# Patient Record
Sex: FEMALE | Race: WHITE | NOT HISPANIC OR LATINO | URBAN - METROPOLITAN AREA
[De-identification: names, ages, dates, MRNs, and addresses within clinical notes are randomized per-mention and may not be internally consistent; named-entity substitution may affect disease eponyms.]

---

## 2019-12-17 ENCOUNTER — APPOINTMENT (RX ONLY)
Dept: URBAN - METROPOLITAN AREA CLINIC 153 | Facility: CLINIC | Age: 74
Setting detail: DERMATOLOGY
End: 2019-12-17

## 2019-12-17 DIAGNOSIS — L57.0 ACTINIC KERATOSIS: ICD-10-CM

## 2019-12-17 DIAGNOSIS — D22 MELANOCYTIC NEVI: ICD-10-CM

## 2019-12-17 DIAGNOSIS — L82.1 OTHER SEBORRHEIC KERATOSIS: ICD-10-CM

## 2019-12-17 DIAGNOSIS — D18.0 HEMANGIOMA: ICD-10-CM

## 2019-12-17 DIAGNOSIS — L81.4 OTHER MELANIN HYPERPIGMENTATION: ICD-10-CM

## 2019-12-17 PROBLEM — D18.01 HEMANGIOMA OF SKIN AND SUBCUTANEOUS TISSUE: Status: ACTIVE | Noted: 2019-12-17

## 2019-12-17 PROBLEM — D22.5 MELANOCYTIC NEVI OF TRUNK: Status: ACTIVE | Noted: 2019-12-17

## 2019-12-17 PROCEDURE — 17000 DESTRUCT PREMALG LESION: CPT

## 2019-12-17 PROCEDURE — ? LIQUID NITROGEN

## 2019-12-17 PROCEDURE — ? COUNSELING

## 2019-12-17 PROCEDURE — 99203 OFFICE O/P NEW LOW 30 MIN: CPT | Mod: 25

## 2019-12-17 ASSESSMENT — LOCATION DETAILED DESCRIPTION DERM
LOCATION DETAILED: PERIUMBILICAL SKIN
LOCATION DETAILED: EPIGASTRIC SKIN
LOCATION DETAILED: SUBXIPHOID
LOCATION DETAILED: LEFT CENTRAL BUCCAL CHEEK

## 2019-12-17 ASSESSMENT — LOCATION SIMPLE DESCRIPTION DERM
LOCATION SIMPLE: ABDOMEN
LOCATION SIMPLE: LEFT CHEEK

## 2019-12-17 ASSESSMENT — LOCATION ZONE DERM
LOCATION ZONE: FACE
LOCATION ZONE: TRUNK

## 2020-12-15 ENCOUNTER — APPOINTMENT (RX ONLY)
Dept: URBAN - METROPOLITAN AREA CLINIC 153 | Facility: CLINIC | Age: 75
Setting detail: DERMATOLOGY
End: 2020-12-15

## 2020-12-15 DIAGNOSIS — L82.1 OTHER SEBORRHEIC KERATOSIS: ICD-10-CM

## 2020-12-15 DIAGNOSIS — D18.0 HEMANGIOMA: ICD-10-CM

## 2020-12-15 DIAGNOSIS — D22 MELANOCYTIC NEVI: ICD-10-CM

## 2020-12-15 DIAGNOSIS — L81.4 OTHER MELANIN HYPERPIGMENTATION: ICD-10-CM

## 2020-12-15 PROBLEM — D18.01 HEMANGIOMA OF SKIN AND SUBCUTANEOUS TISSUE: Status: ACTIVE | Noted: 2020-12-15

## 2020-12-15 PROBLEM — D22.5 MELANOCYTIC NEVI OF TRUNK: Status: ACTIVE | Noted: 2020-12-15

## 2020-12-15 PROCEDURE — 99214 OFFICE O/P EST MOD 30 MIN: CPT

## 2020-12-15 PROCEDURE — ? COUNSELING

## 2020-12-15 ASSESSMENT — LOCATION SIMPLE DESCRIPTION DERM
LOCATION SIMPLE: UPPER BACK
LOCATION SIMPLE: LOWER BACK

## 2020-12-15 ASSESSMENT — LOCATION DETAILED DESCRIPTION DERM
LOCATION DETAILED: INFERIOR THORACIC SPINE
LOCATION DETAILED: SUPERIOR LUMBAR SPINE

## 2020-12-15 ASSESSMENT — LOCATION ZONE DERM: LOCATION ZONE: TRUNK

## 2021-12-14 ENCOUNTER — APPOINTMENT (RX ONLY)
Dept: URBAN - METROPOLITAN AREA CLINIC 153 | Facility: CLINIC | Age: 76
Setting detail: DERMATOLOGY
End: 2021-12-14

## 2021-12-14 DIAGNOSIS — D22 MELANOCYTIC NEVI: ICD-10-CM

## 2021-12-14 DIAGNOSIS — D18.0 HEMANGIOMA: ICD-10-CM

## 2021-12-14 DIAGNOSIS — L81.4 OTHER MELANIN HYPERPIGMENTATION: ICD-10-CM

## 2021-12-14 DIAGNOSIS — L82.1 OTHER SEBORRHEIC KERATOSIS: ICD-10-CM

## 2021-12-14 DIAGNOSIS — Z71.89 OTHER SPECIFIED COUNSELING: ICD-10-CM

## 2021-12-14 DIAGNOSIS — L85.3 XEROSIS CUTIS: ICD-10-CM | Status: INADEQUATELY CONTROLLED

## 2021-12-14 DIAGNOSIS — L57.0 ACTINIC KERATOSIS: ICD-10-CM

## 2021-12-14 PROBLEM — D18.01 HEMANGIOMA OF SKIN AND SUBCUTANEOUS TISSUE: Status: ACTIVE | Noted: 2021-12-14

## 2021-12-14 PROBLEM — D22.5 MELANOCYTIC NEVI OF TRUNK: Status: ACTIVE | Noted: 2021-12-14

## 2021-12-14 PROCEDURE — ? COUNSELING

## 2021-12-14 PROCEDURE — 17003 DESTRUCT PREMALG LES 2-14: CPT

## 2021-12-14 PROCEDURE — ? ADDITIONAL NOTES

## 2021-12-14 PROCEDURE — 17000 DESTRUCT PREMALG LESION: CPT

## 2021-12-14 PROCEDURE — 99213 OFFICE O/P EST LOW 20 MIN: CPT | Mod: 25

## 2021-12-14 PROCEDURE — ? TREATMENT REGIMEN

## 2021-12-14 PROCEDURE — ? LIQUID NITROGEN

## 2021-12-14 ASSESSMENT — LOCATION DETAILED DESCRIPTION DERM
LOCATION DETAILED: LEFT SUPERIOR MEDIAL MALAR CHEEK
LOCATION DETAILED: INFERIOR THORACIC SPINE
LOCATION DETAILED: LEFT INFERIOR CENTRAL MALAR CHEEK
LOCATION DETAILED: SUPERIOR LUMBAR SPINE
LOCATION DETAILED: NASAL TIP
LOCATION DETAILED: LEFT INFERIOR UPPER BACK
LOCATION DETAILED: EPIGASTRIC SKIN
LOCATION DETAILED: RIGHT INFERIOR UPPER BACK

## 2021-12-14 ASSESSMENT — LOCATION SIMPLE DESCRIPTION DERM
LOCATION SIMPLE: UPPER BACK
LOCATION SIMPLE: NOSE
LOCATION SIMPLE: LOWER BACK
LOCATION SIMPLE: LEFT UPPER BACK
LOCATION SIMPLE: RIGHT UPPER BACK
LOCATION SIMPLE: ABDOMEN
LOCATION SIMPLE: LEFT CHEEK

## 2021-12-14 ASSESSMENT — LOCATION ZONE DERM
LOCATION ZONE: NOSE
LOCATION ZONE: FACE
LOCATION ZONE: TRUNK

## 2022-12-19 ENCOUNTER — APPOINTMENT (RX ONLY)
Dept: URBAN - METROPOLITAN AREA CLINIC 153 | Facility: CLINIC | Age: 77
Setting detail: DERMATOLOGY
End: 2022-12-19

## 2022-12-19 DIAGNOSIS — D18.0 HEMANGIOMA: ICD-10-CM

## 2022-12-19 DIAGNOSIS — L57.0 ACTINIC KERATOSIS: ICD-10-CM

## 2022-12-19 DIAGNOSIS — D22 MELANOCYTIC NEVI: ICD-10-CM

## 2022-12-19 DIAGNOSIS — L82.1 OTHER SEBORRHEIC KERATOSIS: ICD-10-CM

## 2022-12-19 DIAGNOSIS — L81.4 OTHER MELANIN HYPERPIGMENTATION: ICD-10-CM

## 2022-12-19 DIAGNOSIS — Z71.89 OTHER SPECIFIED COUNSELING: ICD-10-CM

## 2022-12-19 DIAGNOSIS — L85.3 XEROSIS CUTIS: ICD-10-CM | Status: INADEQUATELY CONTROLLED

## 2022-12-19 PROBLEM — D18.01 HEMANGIOMA OF SKIN AND SUBCUTANEOUS TISSUE: Status: ACTIVE | Noted: 2022-12-19

## 2022-12-19 PROBLEM — D48.5 NEOPLASM OF UNCERTAIN BEHAVIOR OF SKIN: Status: ACTIVE | Noted: 2022-12-19

## 2022-12-19 PROBLEM — D22.5 MELANOCYTIC NEVI OF TRUNK: Status: ACTIVE | Noted: 2022-12-19

## 2022-12-19 PROCEDURE — 17000 DESTRUCT PREMALG LESION: CPT | Mod: 59

## 2022-12-19 PROCEDURE — ? LIQUID NITROGEN

## 2022-12-19 PROCEDURE — ? COUNSELING

## 2022-12-19 PROCEDURE — 17003 DESTRUCT PREMALG LES 2-14: CPT

## 2022-12-19 PROCEDURE — 11102 TANGNTL BX SKIN SINGLE LES: CPT

## 2022-12-19 PROCEDURE — 99213 OFFICE O/P EST LOW 20 MIN: CPT | Mod: 25

## 2022-12-19 PROCEDURE — ? BIOPSY BY SHAVE METHOD

## 2022-12-19 PROCEDURE — ? TREATMENT REGIMEN

## 2022-12-19 ASSESSMENT — LOCATION SIMPLE DESCRIPTION DERM
LOCATION SIMPLE: LOWER BACK
LOCATION SIMPLE: LEFT UPPER BACK
LOCATION SIMPLE: NOSE
LOCATION SIMPLE: LEFT CHEEK
LOCATION SIMPLE: ABDOMEN
LOCATION SIMPLE: RIGHT UPPER BACK
LOCATION SIMPLE: UPPER BACK
LOCATION SIMPLE: LEFT ZYGOMA

## 2022-12-19 ASSESSMENT — LOCATION DETAILED DESCRIPTION DERM
LOCATION DETAILED: SUPERIOR LUMBAR SPINE
LOCATION DETAILED: LEFT SUPERIOR LATERAL MALAR CHEEK
LOCATION DETAILED: RIGHT NASAL DORSUM
LOCATION DETAILED: LEFT CENTRAL ZYGOMA
LOCATION DETAILED: RIGHT INFERIOR UPPER BACK
LOCATION DETAILED: EPIGASTRIC SKIN
LOCATION DETAILED: LEFT INFERIOR UPPER BACK
LOCATION DETAILED: LEFT MEDIAL MALAR CHEEK
LOCATION DETAILED: INFERIOR THORACIC SPINE

## 2022-12-19 ASSESSMENT — LOCATION ZONE DERM
LOCATION ZONE: TRUNK
LOCATION ZONE: NOSE
LOCATION ZONE: FACE

## 2022-12-19 NOTE — PROCEDURE: LIQUID NITROGEN
Render Post-Care Instructions In Note?: no
Duration Of Freeze Thaw-Cycle (Seconds): 3
Consent: The patient's consent was obtained including but not limited to risks of crusting, scabbing, blistering, scarring, darker or lighter pigmentary change, recurrence, incomplete removal and infection.
Detail Level: Detailed
Show Applicator Variable?: Yes
Application Tool (Optional): Liquid Nitrogen Sprayer
Post-Care Instructions: I reviewed with the patient in detail post-care instructions. Patient is to wear sunprotection, and avoid picking at any of the treated lesions. Pt may apply Vaseline to crusted or scabbing areas.
Number Of Freeze-Thaw Cycles: 3 freeze-thaw cycles

## 2022-12-19 NOTE — PROCEDURE: BIOPSY BY SHAVE METHOD
Detail Level: Detailed
Depth Of Biopsy: dermis
Was A Bandage Applied: Yes
Size Of Lesion In Cm: 0.3
Biopsy Type: H and E
Biopsy Method: Personna blade
Anesthesia Type: 1% lidocaine with epinephrine
Anesthesia Volume In Cc (Will Not Render If 0): 0.5
Additional Anesthesia Volume In Cc (Will Not Render If 0): 0
Hemostasis: Drysol
Wound Care: Petrolatum
Dressing: bandage
Destruction After The Procedure: No
Type Of Destruction Used: Curettage
Curettage Text: The wound bed was treated with curettage after the biopsy was performed.
Cryotherapy Text: The wound bed was treated with cryotherapy after the biopsy was performed.
Electrodesiccation Text: The wound bed was treated with electrodesiccation after the biopsy was performed.
Electrodesiccation And Curettage Text: The wound bed was treated with electrodesiccation and curettage after the biopsy was performed.
Silver Nitrate Text: The wound bed was treated with silver nitrate after the biopsy was performed.
Consent: Written consent was obtained and risks were reviewed including but not limited to scarring, infection, bleeding, scabbing, incomplete removal, nerve damage and allergy to anesthesia.
Post-Care Instructions: I reviewed with the patient in detail post-care instructions. Patient is to keep the biopsy site dry overnight, and then apply bacitracin twice daily until healed. Patient may apply hydrogen peroxide soaks to remove any crusting.
Notification Instructions: Patient will be notified of biopsy results. However, patient instructed to call the office if not contacted within 2 weeks.
Billing Type: Third-Party Bill
Information: Selecting Yes will display possible errors in your note based on the variables you have selected. This validation is only offered as a suggestion for you. PLEASE NOTE THAT THE VALIDATION TEXT WILL BE REMOVED WHEN YOU FINALIZE YOUR NOTE. IF YOU WANT TO FAX A PRELIMINARY NOTE YOU WILL NEED TO TOGGLE THIS TO 'NO' IF YOU DO NOT WANT IT IN YOUR FAXED NOTE.
independent/needs device

## 2023-06-20 ENCOUNTER — APPOINTMENT (RX ONLY)
Dept: URBAN - METROPOLITAN AREA CLINIC 153 | Facility: CLINIC | Age: 78
Setting detail: DERMATOLOGY
End: 2023-06-20

## 2023-06-20 DIAGNOSIS — Z41.9 ENCOUNTER FOR PROCEDURE FOR PURPOSES OTHER THAN REMEDYING HEALTH STATE, UNSPECIFIED: ICD-10-CM

## 2023-06-20 PROCEDURE — ? HYDRAFACIAL

## 2023-06-20 ASSESSMENT — LOCATION ZONE DERM: LOCATION ZONE: FACE

## 2023-06-20 ASSESSMENT — LOCATION SIMPLE DESCRIPTION DERM: LOCATION SIMPLE: LEFT CHEEK

## 2023-06-20 ASSESSMENT — LOCATION DETAILED DESCRIPTION DERM: LOCATION DETAILED: LEFT CENTRAL MALAR CHEEK

## 2023-06-20 NOTE — PROCEDURE: HYDRAFACIAL
Procedure: Extraction
Solution: GlySal 7.5%
Number Of Passes: 0
Consent: Written consent obtained, risks reviewed including but not limited to crusting, scabbing, blistering, scarring, darker or lighter pigmentary change, bruising, and/or incomplete response.
Tip Override
Procedure: Fusion
Post-Care Instructions: I reviewed with the patient in detail post-care instructions. Patient should stay away from the sun and wear sun protection until treated areas are fully healed.
Treatment Number: 1
Tip: Hydropeel Tip, Clear
Price (Use Numbers Only, No Special Characters Or $): 199
Solution: Beta-HD
Tip: Hydropeel Tip, Blue
Procedure: Extend and Protect
Procedure: Boost
Solution Override
Indication: anti-aging
Procedure: Exfoliation
Tip: Hydropeel Tip, Teal
Location: other
Procedure: Peel
Solution: Activ-4

## 2024-01-08 ENCOUNTER — APPOINTMENT (RX ONLY)
Dept: URBAN - METROPOLITAN AREA CLINIC 153 | Facility: CLINIC | Age: 79
Setting detail: DERMATOLOGY
End: 2024-01-08

## 2024-01-08 DIAGNOSIS — L85.3 XEROSIS CUTIS: ICD-10-CM | Status: INADEQUATELY CONTROLLED

## 2024-01-08 DIAGNOSIS — Z71.89 OTHER SPECIFIED COUNSELING: ICD-10-CM

## 2024-01-08 DIAGNOSIS — D22 MELANOCYTIC NEVI: ICD-10-CM

## 2024-01-08 DIAGNOSIS — L81.4 OTHER MELANIN HYPERPIGMENTATION: ICD-10-CM

## 2024-01-08 DIAGNOSIS — D18.0 HEMANGIOMA: ICD-10-CM

## 2024-01-08 DIAGNOSIS — L82.1 OTHER SEBORRHEIC KERATOSIS: ICD-10-CM

## 2024-01-08 DIAGNOSIS — L57.0 ACTINIC KERATOSIS: ICD-10-CM

## 2024-01-08 PROBLEM — D18.01 HEMANGIOMA OF SKIN AND SUBCUTANEOUS TISSUE: Status: ACTIVE | Noted: 2024-01-08

## 2024-01-08 PROBLEM — D22.5 MELANOCYTIC NEVI OF TRUNK: Status: ACTIVE | Noted: 2024-01-08

## 2024-01-08 PROCEDURE — 17003 DESTRUCT PREMALG LES 2-14: CPT

## 2024-01-08 PROCEDURE — ? TREATMENT REGIMEN

## 2024-01-08 PROCEDURE — ? COUNSELING

## 2024-01-08 PROCEDURE — 17000 DESTRUCT PREMALG LESION: CPT

## 2024-01-08 PROCEDURE — ? LIQUID NITROGEN

## 2024-01-08 PROCEDURE — 99213 OFFICE O/P EST LOW 20 MIN: CPT | Mod: 25

## 2024-01-08 ASSESSMENT — LOCATION DETAILED DESCRIPTION DERM
LOCATION DETAILED: INFERIOR THORACIC SPINE
LOCATION DETAILED: SUPERIOR LUMBAR SPINE
LOCATION DETAILED: LEFT LATERAL MALAR CHEEK
LOCATION DETAILED: NASAL ROOT
LOCATION DETAILED: EPIGASTRIC SKIN
LOCATION DETAILED: LEFT INFERIOR LATERAL MALAR CHEEK
LOCATION DETAILED: RIGHT INFERIOR UPPER BACK
LOCATION DETAILED: NASAL DORSUM
LOCATION DETAILED: LEFT INFERIOR UPPER BACK

## 2024-01-08 ASSESSMENT — LOCATION SIMPLE DESCRIPTION DERM
LOCATION SIMPLE: LEFT CHEEK
LOCATION SIMPLE: ABDOMEN
LOCATION SIMPLE: RIGHT UPPER BACK
LOCATION SIMPLE: LOWER BACK
LOCATION SIMPLE: LEFT UPPER BACK
LOCATION SIMPLE: NOSE
LOCATION SIMPLE: UPPER BACK

## 2024-01-08 ASSESSMENT — LOCATION ZONE DERM
LOCATION ZONE: TRUNK
LOCATION ZONE: NOSE
LOCATION ZONE: FACE

## 2025-01-06 ENCOUNTER — APPOINTMENT (OUTPATIENT)
Dept: URBAN - METROPOLITAN AREA CLINIC 153 | Facility: CLINIC | Age: 80
Setting detail: DERMATOLOGY
End: 2025-01-06

## 2025-01-06 DIAGNOSIS — L85.3 XEROSIS CUTIS: ICD-10-CM | Status: STABLE

## 2025-01-06 DIAGNOSIS — L30.9 DERMATITIS, UNSPECIFIED: ICD-10-CM | Status: INADEQUATELY CONTROLLED

## 2025-01-06 DIAGNOSIS — D18.0 HEMANGIOMA: ICD-10-CM

## 2025-01-06 DIAGNOSIS — L81.4 OTHER MELANIN HYPERPIGMENTATION: ICD-10-CM

## 2025-01-06 DIAGNOSIS — D22 MELANOCYTIC NEVI: ICD-10-CM

## 2025-01-06 DIAGNOSIS — Z71.89 OTHER SPECIFIED COUNSELING: ICD-10-CM

## 2025-01-06 DIAGNOSIS — L82.1 OTHER SEBORRHEIC KERATOSIS: ICD-10-CM

## 2025-01-06 PROBLEM — D22.5 MELANOCYTIC NEVI OF TRUNK: Status: ACTIVE | Noted: 2025-01-06

## 2025-01-06 PROBLEM — D18.01 HEMANGIOMA OF SKIN AND SUBCUTANEOUS TISSUE: Status: ACTIVE | Noted: 2025-01-06

## 2025-01-06 PROCEDURE — ? PRESCRIPTION

## 2025-01-06 PROCEDURE — 99214 OFFICE O/P EST MOD 30 MIN: CPT

## 2025-01-06 PROCEDURE — ? PRESCRIPTION MEDICATION MANAGEMENT

## 2025-01-06 PROCEDURE — ? TREATMENT REGIMEN

## 2025-01-06 PROCEDURE — ? ADDITIONAL NOTES

## 2025-01-06 PROCEDURE — ? COUNSELING

## 2025-01-06 RX ORDER — HYDROCORTISONE 25 MG/ML
LOTION TOPICAL BID
Qty: 118 | Refills: 1 | Status: ERX | COMMUNITY
Start: 2025-01-06

## 2025-01-06 RX ADMIN — HYDROCORTISONE: 25 LOTION TOPICAL at 00:00

## 2025-01-06 ASSESSMENT — ITCH NUMERIC RATING SCALE: HOW SEVERE IS YOUR ITCHING?: 2

## 2025-01-06 ASSESSMENT — LOCATION ZONE DERM
LOCATION ZONE: TRUNK
LOCATION ZONE: FACE

## 2025-01-06 ASSESSMENT — LOCATION DETAILED DESCRIPTION DERM
LOCATION DETAILED: LEFT MEDIAL MALAR CHEEK
LOCATION DETAILED: LEFT LATERAL ABDOMEN
LOCATION DETAILED: EPIGASTRIC SKIN
LOCATION DETAILED: LEFT INFERIOR UPPER BACK
LOCATION DETAILED: RIGHT CENTRAL MALAR CHEEK
LOCATION DETAILED: LEFT INFERIOR MEDIAL MIDBACK

## 2025-01-06 ASSESSMENT — LOCATION SIMPLE DESCRIPTION DERM
LOCATION SIMPLE: RIGHT CHEEK
LOCATION SIMPLE: LEFT CHEEK
LOCATION SIMPLE: ABDOMEN
LOCATION SIMPLE: LEFT LOWER BACK
LOCATION SIMPLE: LEFT UPPER BACK

## 2025-01-06 ASSESSMENT — BSA RASH: BSA RASH: 8

## 2025-01-06 NOTE — PROCEDURE: ADDITIONAL NOTES
Render Risk Assessment In Note?: no
Detail Level: Detailed
Additional Notes: Pt discussed change in makeup brand to [employer] at the start of breakout roughly a month ago, changed back to familiar [employer] 3wks ago

## 2025-01-06 NOTE — PROCEDURE: COUNSELING
Detail Level: Simple
Detail Level: Generalized
Sunscreen Recommendations: Patient is to use sunscreen daily on the affected areas

## 2025-01-06 NOTE — PROCEDURE: PRESCRIPTION MEDICATION MANAGEMENT
Initiate Treatment: Pt is to apply HC 2.5% twice daily for next 2wks until 2wk follow-up
Detail Level: Zone
Render In Strict Bullet Format?: No

## 2025-01-21 ENCOUNTER — APPOINTMENT (OUTPATIENT)
Dept: URBAN - METROPOLITAN AREA CLINIC 153 | Facility: CLINIC | Age: 80
Setting detail: DERMATOLOGY
End: 2025-01-21

## 2025-01-21 DIAGNOSIS — L30.9 DERMATITIS, UNSPECIFIED: ICD-10-CM

## 2025-01-21 PROCEDURE — 99214 OFFICE O/P EST MOD 30 MIN: CPT

## 2025-01-21 PROCEDURE — ? COUNSELING

## 2025-01-21 PROCEDURE — ? PRESCRIPTION MEDICATION MANAGEMENT

## 2025-01-21 PROCEDURE — ? ADDITIONAL NOTES

## 2025-01-21 ASSESSMENT — LOCATION SIMPLE DESCRIPTION DERM
LOCATION SIMPLE: RIGHT CHEEK
LOCATION SIMPLE: LEFT CHEEK

## 2025-01-21 ASSESSMENT — SEVERITY ASSESSMENT: SEVERITY: MILD

## 2025-01-21 ASSESSMENT — BSA RASH: BSA RASH: 2

## 2025-01-21 ASSESSMENT — PAIN INTENSITY VAS: HOW INTENSE IS YOUR PAIN 0 BEING NO PAIN, 10 BEING THE MOST SEVERE PAIN POSSIBLE?: NO PAIN

## 2025-01-21 ASSESSMENT — LOCATION DETAILED DESCRIPTION DERM
LOCATION DETAILED: RIGHT CENTRAL MALAR CHEEK
LOCATION DETAILED: LEFT MEDIAL MALAR CHEEK

## 2025-01-21 ASSESSMENT — LOCATION ZONE DERM: LOCATION ZONE: FACE

## 2025-01-21 ASSESSMENT — ITCH NUMERIC RATING SCALE: HOW SEVERE IS YOUR ITCHING?: 1

## 2025-01-21 NOTE — PROCEDURE: PRESCRIPTION MEDICATION MANAGEMENT
Continue Regimen: to apply HC 2.5% once a day at night for two more weeks to only affected areas on the face
Plan: Patient is to apply Aquaphor in the morning on patchy areas of the face
Detail Level: Zone
Render In Strict Bullet Format?: No

## 2025-01-21 NOTE — PROCEDURE: MIPS QUALITY
Detail Level: Detailed
Quality 130: Documentation Of Current Medications In The Medical Record: Current Medications Documented
Quality 486: Dermatitis - Improvement In Patient-Reported Itch Severity: Itch severity assessment score was not reduced by at least 3 points from the initial (index) score to the follow-up visit score or assessment was not completed during the follow-up encounter

## 2025-01-21 NOTE — PROCEDURE: ADDITIONAL NOTES
Render Risk Assessment In Note?: no
Detail Level: Detailed
Additional Notes: Pt discussed change in makeup brand to [employer] at the start of breakout roughly a month ago, changed back to [employer]

## 2025-02-18 ENCOUNTER — APPOINTMENT (OUTPATIENT)
Dept: URBAN - METROPOLITAN AREA CLINIC 153 | Facility: CLINIC | Age: 80
Setting detail: DERMATOLOGY
End: 2025-02-18

## 2025-02-18 DIAGNOSIS — L57.0 ACTINIC KERATOSIS: ICD-10-CM

## 2025-02-18 DIAGNOSIS — L30.9 DERMATITIS, UNSPECIFIED: ICD-10-CM

## 2025-02-18 PROCEDURE — 99214 OFFICE O/P EST MOD 30 MIN: CPT

## 2025-02-18 PROCEDURE — ? COUNSELING

## 2025-02-18 PROCEDURE — ? PRESCRIPTION MEDICATION MANAGEMENT

## 2025-02-18 PROCEDURE — ? ORDER FOR PHOTODYNAMIC THERAPY

## 2025-02-18 PROCEDURE — ? ADDITIONAL NOTES

## 2025-02-18 ASSESSMENT — LOCATION DETAILED DESCRIPTION DERM
LOCATION DETAILED: RIGHT CENTRAL MALAR CHEEK
LOCATION DETAILED: LEFT CENTRAL MALAR CHEEK
LOCATION DETAILED: LEFT MEDIAL MALAR CHEEK

## 2025-02-18 ASSESSMENT — BSA RASH: BSA RASH: 2

## 2025-02-18 ASSESSMENT — LOCATION ZONE DERM: LOCATION ZONE: FACE

## 2025-02-18 ASSESSMENT — LOCATION SIMPLE DESCRIPTION DERM
LOCATION SIMPLE: RIGHT CHEEK
LOCATION SIMPLE: LEFT CHEEK

## 2025-02-18 ASSESSMENT — ITCH NUMERIC RATING SCALE: HOW SEVERE IS YOUR ITCHING?: 0

## 2025-02-18 NOTE — PROCEDURE: ORDER FOR PHOTODYNAMIC THERAPY
Detail Level: Zone
Pdt Type: SAUL-U
Scalp Incubation Time: 3 Hours
Debridement: Yes
Hands Incubation Time: 2 Hours
Occlusion: No
Frequency Of Pdt: Single Treatment
Neck Incubation Time: 1 Hour
Location: Face
Face Incubation Time: 1 hr
Consent: The procedure and risks were reviewed with the patient including but not limited to: burning, pigmentary changes, pain, blistering, scabbing, redness, and the possibility of needing numerous treatments. Strict photoprotection after the procedure was also discussed.
Face And Scalp Incubation Time: 1 Hour for the face and 2 Hours for the scalp
Photosensitizer: Levulan

## 2025-02-18 NOTE — PROCEDURE: ADDITIONAL NOTES
Render Risk Assessment In Note?: no
Detail Level: Detailed
Additional Notes: Pt discussed change in makeup brand to [employer] at the start of breakout, changed back to [employer]
Additional Notes: Patient states they are traveling until mid-March, in patient’s best interest to schedule PDT after social engagements end of March/April

## 2025-02-18 NOTE — PROCEDURE: PRESCRIPTION MEDICATION MANAGEMENT
Modify Regimen: to apply HC 2.5% once a day at night as needed to only affected areas on the face
Plan: Patient is to apply Aquaphor in the morning on patchy areas of the face
Detail Level: Zone
Render In Strict Bullet Format?: No